# Patient Record
Sex: FEMALE | Race: WHITE | NOT HISPANIC OR LATINO | Employment: OTHER | URBAN - METROPOLITAN AREA
[De-identification: names, ages, dates, MRNs, and addresses within clinical notes are randomized per-mention and may not be internally consistent; named-entity substitution may affect disease eponyms.]

---

## 2021-02-04 ENCOUNTER — IMMUNIZATION (OUTPATIENT)
Dept: LAB | Age: 71
End: 2021-02-04

## 2021-02-04 DIAGNOSIS — Z23 NEED FOR VACCINATION: Primary | ICD-10-CM

## 2021-02-04 PROCEDURE — 0001A COVID 19 PFIZER-BIONTECH: CPT

## 2021-02-04 PROCEDURE — 91300 COVID 19 PFIZER-BIONTECH: CPT

## 2021-02-25 ENCOUNTER — IMMUNIZATION (OUTPATIENT)
Dept: LAB | Age: 71
End: 2021-02-25

## 2021-02-25 DIAGNOSIS — Z23 NEED FOR VACCINATION: Primary | ICD-10-CM

## 2021-02-25 PROCEDURE — 0002A COVID 19 PFIZER-BIONTECH: CPT

## 2021-02-25 PROCEDURE — 91300 COVID 19 PFIZER-BIONTECH: CPT

## 2021-06-28 ENCOUNTER — HOSPITAL ENCOUNTER (OUTPATIENT)
Age: 71
Discharge: HOME | End: 2021-06-28
Attending: EMERGENCY MEDICINE
Payer: MEDICARE

## 2021-06-28 ENCOUNTER — APPOINTMENT (OUTPATIENT)
Dept: GENERAL RADIOLOGY | Age: 71
End: 2021-06-28
Attending: EMERGENCY MEDICINE
Payer: MEDICARE

## 2021-06-28 ENCOUNTER — TELEPHONE (OUTPATIENT)
Dept: ORTHOPEDICS CLINIC | Facility: CLINIC | Age: 71
End: 2021-06-28

## 2021-06-28 VITALS
TEMPERATURE: 98 F | RESPIRATION RATE: 18 BRPM | HEART RATE: 68 BPM | SYSTOLIC BLOOD PRESSURE: 147 MMHG | DIASTOLIC BLOOD PRESSURE: 77 MMHG | OXYGEN SATURATION: 98 %

## 2021-06-28 DIAGNOSIS — M25.422 EFFUSION OF LEFT ELBOW: Primary | ICD-10-CM

## 2021-06-28 PROCEDURE — 29105 APPLICATION LONG ARM SPLINT: CPT

## 2021-06-28 PROCEDURE — 73080 X-RAY EXAM OF ELBOW: CPT | Performed by: EMERGENCY MEDICINE

## 2021-06-28 PROCEDURE — 99203 OFFICE O/P NEW LOW 30 MIN: CPT

## 2021-06-28 RX ORDER — CHLORTHALIDONE 25 MG/1
25 TABLET ORAL DAILY
COMMUNITY

## 2021-06-28 NOTE — TELEPHONE ENCOUNTER
Pt called stating pt went to the immediate care today 6-28-21 for possible radial head fracture, left. Pt is going out of town on 6-30-21. Can pt be seen tomorrow 6-29-21. No appointments available.    Call pt

## 2021-06-28 NOTE — TELEPHONE ENCOUNTER
S/w pt and she states she slipped and fell on 6/27/21. Pt went to UC today and had Xr and put in long arm splint and sling. Pt rates her pain 1/10 now with splint on. Pt states she is comfortable in splint, can move fingers, no numbness/tingling.  appt made

## 2021-06-28 NOTE — ED PROVIDER NOTES
Patient Seen in: [facility] [place]      History   Patient presents with:  Fall    Stated Complaint: ARM PAIN    HPI/Subjective:   HPI    The patient is a 62-year-old female with past history of osteoporosis who presents now with left elbow pain afte There is mild focal tenderness and swelling of the olecranon process. There is mild discomfort with full extension of the left arm. Patient can flex and extend at the elbow.   Skin: No pallor, no redness or warmth to the touch      ED Course   Labs Review

## 2021-06-29 ENCOUNTER — OFFICE VISIT (OUTPATIENT)
Dept: ORTHOPEDICS CLINIC | Facility: CLINIC | Age: 71
End: 2021-06-29
Payer: MEDICARE

## 2021-06-29 VITALS — WEIGHT: 117.63 LBS | BODY MASS INDEX: 21.65 KG/M2 | HEIGHT: 62 IN

## 2021-06-29 DIAGNOSIS — M25.422 EFFUSION OF ELBOW JOINT, LEFT: ICD-10-CM

## 2021-06-29 DIAGNOSIS — S46.312A RUPTURE OF TRICEPS TENDON, LEFT, INITIAL ENCOUNTER: Primary | ICD-10-CM

## 2021-06-29 PROCEDURE — 99204 OFFICE O/P NEW MOD 45 MIN: CPT | Performed by: ORTHOPAEDIC SURGERY

## 2021-06-29 RX ORDER — POTASSIUM CHLORIDE 20 MEQ/1
TABLET, EXTENDED RELEASE ORAL
COMMUNITY
Start: 2021-06-02

## 2021-06-29 NOTE — H&P
NURSING INTAKE COMMENTS: Patient presents with:  Consult: referred by urgent care in [place] due to possible left elbow fx, XR 6/28/21, pt states she slipped on a wet floor and fell on 6/27, .5/10 pain today with slight swelling of fingers of left hand, pt sinus pain or ST  LUNGS: denies shortness of breath  CARDIOVASCULAR: denies chest pain  GI: no hematemesis, no worsening heartburn, no diarrhea  : no dysuria, no blood in urine, no difficulty urinating, no incontinence  MUSCULOSKELETAL: no other musculos Dictated by (CST): Nayely Dickerson MD on 6/28/2021 at 2:18 PM     Finalized by (CST):  Nayely Dickerson MD on 6/28/2021 at 2:20 PM             No results found for: WBC, HGB, PLT   No results found for: GLU, BUN, CREATSERUM, GFR, GFRNAA, GFRAA     Assess

## 2021-07-09 ENCOUNTER — HOSPITAL ENCOUNTER (OUTPATIENT)
Dept: MRI IMAGING | Facility: HOSPITAL | Age: 71
Discharge: HOME | End: 2021-07-09
Attending: ORTHOPAEDIC SURGERY
Payer: MEDICARE

## 2021-07-09 DIAGNOSIS — M25.422 EFFUSION OF ELBOW JOINT, LEFT: ICD-10-CM

## 2021-07-09 DIAGNOSIS — S46.312A RUPTURE OF TRICEPS TENDON, LEFT, INITIAL ENCOUNTER: ICD-10-CM

## 2021-07-09 PROCEDURE — 73221 MRI JOINT UPR EXTREM W/O DYE: CPT | Performed by: ORTHOPAEDIC SURGERY

## 2021-07-13 ENCOUNTER — OFFICE VISIT (OUTPATIENT)
Dept: ORTHOPEDICS CLINIC | Facility: CLINIC | Age: 71
End: 2021-07-13
Payer: MEDICARE

## 2021-07-13 VITALS — WEIGHT: 117 LBS | BODY MASS INDEX: 21.53 KG/M2 | HEIGHT: 62 IN

## 2021-07-13 DIAGNOSIS — S46.312A RUPTURE OF TRICEPS TENDON, LEFT, INITIAL ENCOUNTER: Primary | ICD-10-CM

## 2021-07-13 PROCEDURE — 99213 OFFICE O/P EST LOW 20 MIN: CPT | Performed by: ORTHOPAEDIC SURGERY

## 2021-07-13 NOTE — PROGRESS NOTES
NURSING INTAKE COMMENTS: Patient presents with:  Test Results: pt in for f/u on left elbow MRI results, pt denies any pain today      HPI: This 73year old female presents today 2.5 weeks after left triceps tendon rupture.   It was a partial rupture of the HPI  NEURO: no numbness or tingling, no weakness or balance disorder  PSYCHE: no depression or anxiety  HEMATOLOGIC: no hx of blood dyscrasia, no Hx DVT/PE  ENDOCRINE: no thyroid or diabetes issues  ALL/ASTHMA: no new hx of severe allergy or asthma    Phys INDICATIONS: M25.422 Effusion of elbow joint, left S46.312A Rupture of triceps tendon, left, initial encounter  TECHNIQUE: A complete multi-planar examination was performed without contrast.   FINDINGS:  TENDONS EXTENSORS: Intact common extensor tendon. David Chen functioning almost normally at this point. We will hold on surgical recommendation. For now she will try to advance her use of the left upper extremity. If it is not satisfactory we could always discuss surgery. Otherwise I will see her as needed.     Fabio Mason

## 2023-10-15 ENCOUNTER — HOSPITAL ENCOUNTER (OUTPATIENT)
Age: 73
Discharge: HOME | End: 2023-10-15
Attending: EMERGENCY MEDICINE
Payer: MEDICARE

## 2023-10-15 VITALS
TEMPERATURE: 98 F | SYSTOLIC BLOOD PRESSURE: 126 MMHG | RESPIRATION RATE: 18 BRPM | OXYGEN SATURATION: 97 % | DIASTOLIC BLOOD PRESSURE: 81 MMHG | HEART RATE: 70 BPM

## 2023-10-15 DIAGNOSIS — B02.8 HERPES ZOSTER WITH COMPLICATION: Primary | ICD-10-CM

## 2023-10-15 PROCEDURE — 99213 OFFICE O/P EST LOW 20 MIN: CPT

## 2023-10-15 PROCEDURE — 99214 OFFICE O/P EST MOD 30 MIN: CPT

## 2023-10-15 RX ORDER — VALACYCLOVIR HYDROCHLORIDE 1 G/1
1000 TABLET, FILM COATED ORAL 3 TIMES DAILY
Qty: 21 TABLET | Refills: 0 | Status: SHIPPED | OUTPATIENT
Start: 2023-10-15 | End: 2023-10-22

## 2023-10-15 NOTE — ED INITIAL ASSESSMENT (HPI)
Pt presents with initially pain to left groin x 2 days. Yesterday pt developed rash to left low abdomen and left low back. Rash is painful to the touch. Rash is red, raised and scattered.

## 2023-10-15 NOTE — DISCHARGE INSTRUCTIONS
Thank you for visiting our immediate care for your health care needs. Please follow up with your regular doctor in the next 1-2 days. If you have any additional problems please return to the immediate care. Please take Tylenol and or Motrin for pain and fevers. Please use Salonpas lidocaine for pain. Please take Valtrex as prescribed.

## 2024-03-14 NOTE — ED INITIAL ASSESSMENT (HPI)
PATIENT ARRIVED AMBULATORY TO ROOM C/O LEFT ELBOW PAIN S/P A FALL THAT OCCURRED YESTERDAY. PATIENT STATES SHE SLIPPED ON A WET FLOOR YESTERDAY. DENIES HITTING HER HEAD. NO OBVIOUS DEFORMITY.  STRONG RADIAL PULSE [FreeTextEntry2] : left foot